# Patient Record
Sex: MALE | Race: WHITE | NOT HISPANIC OR LATINO | Employment: OTHER | URBAN - NONMETROPOLITAN AREA
[De-identification: names, ages, dates, MRNs, and addresses within clinical notes are randomized per-mention and may not be internally consistent; named-entity substitution may affect disease eponyms.]

---

## 2021-07-07 ENCOUNTER — HOSPITAL ENCOUNTER (OUTPATIENT)
Dept: RADIOLOGY | Facility: HOSPITAL | Age: 66
Discharge: HOME | End: 2021-07-07
Attending: FAMILY MEDICINE
Payer: COMMERCIAL

## 2021-07-07 DIAGNOSIS — Z13.6 SCREENING FOR AAA (AORTIC ABDOMINAL ANEURYSM): ICD-10-CM

## 2021-07-07 PROCEDURE — 76706 US ABDL AORTA SCREEN AAA: CPT | Mod: TC

## 2025-05-18 ENCOUNTER — HOSPITAL ENCOUNTER (EMERGENCY)
Facility: HOSPITAL | Age: 70
Discharge: HOME | End: 2025-05-18
Payer: COMMERCIAL

## 2025-05-18 VITALS
HEIGHT: 65 IN | DIASTOLIC BLOOD PRESSURE: 71 MMHG | SYSTOLIC BLOOD PRESSURE: 154 MMHG | OXYGEN SATURATION: 98 % | WEIGHT: 172 LBS | BODY MASS INDEX: 28.66 KG/M2 | HEART RATE: 97 BPM | RESPIRATION RATE: 18 BRPM | TEMPERATURE: 98 F

## 2025-05-18 DIAGNOSIS — S89.91XA RIGHT KNEE INJURY: ICD-10-CM

## 2025-05-18 DIAGNOSIS — S79.911A HIP INJURY, RIGHT, INITIAL ENCOUNTER: Primary | ICD-10-CM

## 2025-05-18 PROCEDURE — 96372 THER/PROPH/DIAG INJ SC/IM: CPT | Performed by: NURSE PRACTITIONER

## 2025-05-18 PROCEDURE — 63600175 PHARM REV CODE 636 W HCPCS: Mod: JZ,TB | Performed by: NURSE PRACTITIONER

## 2025-05-18 PROCEDURE — 99284 EMERGENCY DEPT VISIT MOD MDM: CPT | Mod: GF | Performed by: NURSE PRACTITIONER

## 2025-05-18 PROCEDURE — 99284 EMERGENCY DEPT VISIT MOD MDM: CPT | Mod: 25

## 2025-05-18 RX ORDER — KETOROLAC TROMETHAMINE 30 MG/ML
15 INJECTION, SOLUTION INTRAMUSCULAR; INTRAVENOUS
Status: COMPLETED | OUTPATIENT
Start: 2025-05-18 | End: 2025-05-18

## 2025-05-18 RX ORDER — NAPROXEN SODIUM 220 MG
220 TABLET ORAL DAILY PRN
COMMUNITY

## 2025-05-18 RX ADMIN — KETOROLAC TROMETHAMINE 15 MG: 30 INJECTION, SOLUTION INTRAMUSCULAR at 04:05

## 2025-05-18 NOTE — ED PROVIDER NOTES
Encounter Date: 5/18/2025       History     Chief Complaint   Patient presents with    Knee Injury     69 y/o male arrived to the ED via POV with complaint of right knee pain with swelling and right hip pain related to injury that occurred yesterday on 05/17. 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 Has bruising to right hip. Difficulty with ambulation due to pain. Rates pain 9/10. No radiation of Has pain, numbness or tingling reported. Has been taking Aleve 3 tabs by mouth every 4 hours as needed for pain some relief of pain. Did not hit head, had no LOC and no other injuries reported.     The history is provided by the patient.     Review of patient's allergies indicates:  No Known Allergies  Past Medical History:   Diagnosis Date    Coronary artery disease      Past Surgical History:   Procedure Laterality Date    STENT-BYPASS GRAFT       No family history on file.  Social History[1]  Review of Systems   Constitutional:  Negative for activity change, appetite change, fatigue and fever.   HENT:  Negative for congestion, ear discharge, ear pain, postnasal drip, rhinorrhea, sinus pressure, sinus pain, sore throat and trouble swallowing.    Eyes:  Negative for pain, redness and visual disturbance.   Respiratory:  Negative for cough and shortness of breath.    Cardiovascular:  Negative for chest pain, palpitations and leg swelling.   Gastrointestinal:  Negative for abdominal pain, constipation, diarrhea, nausea and vomiting.   Genitourinary:  Negative for dysuria, flank pain, frequency and hematuria.   Musculoskeletal:  Positive for arthralgias (right knee and right hip pain), gait problem and joint swelling (right knee swelling). Negative for myalgias, neck pain and neck stiffness.   Skin:  Positive for color change (bruise to right hip). Negative for wound.   Neurological:  Negative for dizziness, speech difficulty, weakness, light-headedness and headaches.    Psychiatric/Behavioral:  Negative for agitation, confusion, self-injury, sleep disturbance and suicidal ideas. The patient is not nervous/anxious.    All other systems reviewed and are negative.      Physical Exam     Initial Vitals [05/18/25 1548]   BP Pulse Resp Temp SpO2   (!) 154/71 97 18 97.5 °F (36.4 °C) 98 %      MAP       --         Physical Exam    Nursing note and vitals reviewed.  Constitutional: Vital signs are normal. He appears well-developed and well-nourished. He is cooperative. He does not appear ill. He appears distressed (appears in pain).   HENT:   Head: Normocephalic and atraumatic.   Right Ear: External ear normal.   Left Ear: External ear normal.   Nose: Nose normal. Mouth/Throat: Mucous membranes are normal.   Eyes: Conjunctivae and lids are normal. Pupils are equal, round, and reactive to light.   Neck: Neck supple.   Normal range of motion.   Full passive range of motion without pain.     Cardiovascular:  Normal rate, regular rhythm, normal heart sounds and normal pulses.           Pulmonary/Chest: Effort normal and breath sounds normal.   Abdominal: Abdomen is soft. Bowel sounds are normal. There is no abdominal tenderness.   Musculoskeletal:      Cervical back: Normal, full passive range of motion without pain, normal range of motion and neck supple.      Thoracic back: Normal.      Lumbar back: Normal.      Right hip: Tenderness and bony tenderness present. No lacerations or crepitus. Normal range of motion.      Left hip: Normal.      Right upper leg: Normal.      Right knee: Swelling and bony tenderness present. No deformity. Decreased range of motion. Tenderness present. Normal pulse.      Left knee: Normal.      Right lower leg: Normal.     Neurological: He is alert and oriented to person, place, and time. No sensory deficit. Gait (limping gait) abnormal.   Skin: Skin is warm, dry and intact. Bruising (right hip) noted. No rash noted.   Psychiatric: He has a normal mood and affect.  His speech is normal and behavior is normal. Judgment normal.         Medical Screening Exam   See Full Note    ED Course   Procedures  Labs Reviewed - No data to display       Imaging Results              X-Ray Knee 3 View Right (Final result)  Result time 05/18/25 17:57:17      Final result by Juan Brian MD (05/18/25 17:57:17)                   Impression:      No acute abnormality.  Arthritic change all compartments more apparent laterally and patellofemoral joint.  Joint effusion.      Electronically signed by: Juan Brian  Date:    05/18/2025  Time:    17:57               Narrative:    EXAMINATION:  XR KNEE 3 VIEW RIGHT    CLINICAL HISTORY:  Unspecified injury of right lower leg, initial encounter    TECHNIQUE:  Multipleviews were performed.    COMPARISON:  None    FINDINGS:  No fracture seen.  There is narrowing of all compartments more apparent laterally with eburnation of the femoral condyles and tibial plateaus.  Dorsal patellar osteophyte.  No bony erosions.  No radiopaque foreign body.  No soft tissue abnormality.  Joint effusion.                                       X-Ray Hip 2 or 3 views Right with Pelvis when performed (Final result)  Result time 05/18/25 17:58:39      Final result by Juan Brian MD (05/18/25 17:58:39)                   Impression:      No acute bony abnormality seen about the pelvis and right hip.      Electronically signed by: Juan Brian  Date:    05/18/2025  Time:    17:58               Narrative:    EXAMINATION:  XR HIP WITH PELVIS WHEN PERFORMED 2 OR 3 VIEWS RIGHT    CLINICAL HISTORY:  right hip injury;    TECHNIQUE:  Multipleviews were performed.    COMPARISON:  None    FINDINGS:  No fracture seen.  Joint spaces are maintained.  No bony erosions.  No radiopaque foreign body.  No soft tissue abnormality.                                       Medications   ketorolac injection 15 mg (15 mg Intramuscular Given 5/18/25 9385)     Medical Decision Making  71 y/o male  arrived to the ED via POV with complaint of right knee pain with swelling and right hip pain related to injury that occurred yesterday on 05/17. 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 Has bruising to right hip. Difficulty with ambulation due to pain. Rates pain 9/10. No radiation of Has pain, numbness or tingling reported. Has been taking Aleve 3 tabs by mouth every 4 hours as needed for pain some relief of pain. Did not hit head, had no LOC and no other injuries reported.     Problems Addressed:  Hip injury, right, initial encounter: acute illness or injury     Details: Right hip x-ray: negative  -Tylenol 500 mg take 2 tabs by mouth every 6 hours as needed for pain.  -Alternate with Aleve 2 tabs by mouth every 12 hours with food to help decrease risk of upset stomach.   -Ice pack on 10-15 minutes, then off 45 minutes. Elevated and rest  Right knee injury: acute illness or injury     Details: Right knee x-ray: negative  Treatment as above. Reviewed discharge instructions with patient. Follow up with PCP. If s/s persist may consider MRI. Patient agreed to treatment plan and verbalized understanding.    Amount and/or Complexity of Data Reviewed  Radiology: ordered.    Risk  Prescription drug management.                                      Clinical Impression:   Final diagnoses:  [S89.91XA] Right knee injury  [S79.911A] Hip injury, right, initial encounter (Primary)        ED Disposition Condition    Discharge Stable          ED Prescriptions    None       Follow-up Information       Follow up With Specialties Details Why Contact Info    Primary Care Provider  Call in 1 day schedule appointment to follow up from your ER visit                [1]   Social History  Tobacco Use    Smoking status: Never     Passive exposure: Never    Smokeless tobacco: Current     Types: Snuff   Substance Use Topics    Alcohol use: Yes     Comment: occasional    Drug use: Never        Michelle Rene  FIORELLA, [facility]  05/18/25 1809

## 2025-05-18 NOTE — DISCHARGE INSTRUCTIONS
-Tylenol 500 mg take 2 tabs by mouth every 6 hours as needed for pain.  -Alternate with Aleve 2 tabs by mouth every 12 hours with food to help decrease risk of upset stomach.   -Ice pack on 10-15 minutes, then off 45 minutes. Elevated and rest